# Patient Record
Sex: FEMALE | Race: AMERICAN INDIAN OR ALASKA NATIVE
[De-identification: names, ages, dates, MRNs, and addresses within clinical notes are randomized per-mention and may not be internally consistent; named-entity substitution may affect disease eponyms.]

---

## 2022-07-04 ENCOUNTER — HOSPITAL ENCOUNTER (INPATIENT)
Dept: HOSPITAL 5 - ED | Age: 87
LOS: 2 days | Discharge: HOME | DRG: 312 | End: 2022-07-06
Attending: INTERNAL MEDICINE | Admitting: INTERNAL MEDICINE
Payer: MEDICARE
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
## 2022-07-04 DIAGNOSIS — M06.9: ICD-10-CM

## 2022-07-04 DIAGNOSIS — I10: ICD-10-CM

## 2022-07-04 DIAGNOSIS — M48.56XA: ICD-10-CM

## 2022-07-04 DIAGNOSIS — E78.5: ICD-10-CM
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
## 2022-07-04 DIAGNOSIS — R00.2: ICD-10-CM

## 2022-07-04 DIAGNOSIS — R55: Primary | ICD-10-CM

## 2022-07-04 DIAGNOSIS — R07.89: ICD-10-CM
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
## 2022-07-04 DIAGNOSIS — M48.061: ICD-10-CM

## 2022-07-04 LAB
ALBUMIN SERPL-MCNC: 4.5 G/DL (ref 3.9–5)
ALT SERPL-CCNC: 9 UNITS/L (ref 7–56)
BASOPHILS # (AUTO): 0 K/MM3 (ref 0–0.1)
BASOPHILS NFR BLD AUTO: 0.6 % (ref 0–1.8)
BLOOD UR QL VISUAL: (no result)
BUN SERPL-MCNC: 13 MG/DL (ref 7–17)
BUN/CREAT SERPL: 14 %
CALCIUM SERPL-MCNC: 8.7 MG/DL (ref 8.4–10.2)
EOSINOPHIL # BLD AUTO: 0.2 K/MM3 (ref 0–0.4)
EOSINOPHIL NFR BLD AUTO: 2.7 % (ref 0–4.3)
HCT VFR BLD CALC: 38.7 % (ref 30.3–42.9)
HEMOLYSIS INDEX: 4
HGB BLD-MCNC: 12.5 GM/DL (ref 10.1–14.3)
LYMPHOCYTES # BLD AUTO: 1.4 K/MM3 (ref 1.2–5.4)
LYMPHOCYTES NFR BLD AUTO: 21.4 % (ref 13.4–35)
MCHC RBC AUTO-ENTMCNC: 32 % (ref 30–34)
MCV RBC AUTO: 94 FL (ref 79–97)
MONOCYTES # (AUTO): 0.5 K/MM3 (ref 0–0.8)
MONOCYTES % (AUTO): 8 % (ref 0–7.3)
PH UR STRIP: 7 [PH] (ref 5–7)
PLATELET # BLD: 193 K/MM3 (ref 140–440)
PROT UR STRIP-MCNC: (no result) MG/DL
RBC # BLD AUTO: 4.12 M/MM3 (ref 3.65–5.03)
RBC #/AREA URNS HPF: 9 /HPF (ref 0–6)
UROBILINOGEN UR-MCNC: 1 MG/DL (ref ?–2)
WBC #/AREA URNS HPF: 12 /HPF (ref 0–6)

## 2022-07-04 PROCEDURE — 36415 COLL VENOUS BLD VENIPUNCTURE: CPT

## 2022-07-04 PROCEDURE — 85610 PROTHROMBIN TIME: CPT

## 2022-07-04 PROCEDURE — 93321 DOPPLER ECHO F-UP/LMTD STD: CPT

## 2022-07-04 PROCEDURE — 87086 URINE CULTURE/COLONY COUNT: CPT

## 2022-07-04 PROCEDURE — 71275 CT ANGIOGRAPHY CHEST: CPT

## 2022-07-04 PROCEDURE — 72131 CT LUMBAR SPINE W/O DYE: CPT

## 2022-07-04 PROCEDURE — 71046 X-RAY EXAM CHEST 2 VIEWS: CPT

## 2022-07-04 PROCEDURE — 80061 LIPID PANEL: CPT

## 2022-07-04 PROCEDURE — 93308 TTE F-UP OR LMTD: CPT

## 2022-07-04 PROCEDURE — 80048 BASIC METABOLIC PNL TOTAL CA: CPT

## 2022-07-04 PROCEDURE — 84484 ASSAY OF TROPONIN QUANT: CPT

## 2022-07-04 PROCEDURE — 71110 X-RAY EXAM RIBS BIL 3 VIEWS: CPT

## 2022-07-04 PROCEDURE — 81001 URINALYSIS AUTO W/SCOPE: CPT

## 2022-07-04 PROCEDURE — 85025 COMPLETE CBC W/AUTO DIFF WBC: CPT

## 2022-07-04 PROCEDURE — 80053 COMPREHEN METABOLIC PANEL: CPT

## 2022-07-04 PROCEDURE — 72125 CT NECK SPINE W/O DYE: CPT

## 2022-07-04 PROCEDURE — 93325 DOPPLER ECHO COLOR FLOW MAPG: CPT

## 2022-07-04 PROCEDURE — 93005 ELECTROCARDIOGRAM TRACING: CPT

## 2022-07-04 PROCEDURE — 70450 CT HEAD/BRAIN W/O DYE: CPT

## 2022-07-04 PROCEDURE — 93880 EXTRACRANIAL BILAT STUDY: CPT

## 2022-07-04 PROCEDURE — 85730 THROMBOPLASTIN TIME PARTIAL: CPT

## 2022-07-04 NOTE — CAT SCAN REPORT
CT HEAD WITHOUT CONTRAST



INDICATION / CLINICAL INFORMATION:

syncope 4 days ago.



TECHNIQUE:

All CT scans at this location are performed using CT dose reduction for ALARA by means of automated e
xposure control. 



COMPARISON:

None available.



FINDINGS:

HEMORRHAGE: No evidence of intracranial hemorrhage or extra-axial fluid collection.

EXTRA-AXIAL SPACES: Cortical sulci, sylvian fissures and basilar cisterns have an unremarkable appear
ance.

VENTRICULAR SYSTEM: The third and lateral ventricles are of normal size and configuration.

CEREBRAL PARENCHYMA: No areas of abnormal brain parenchymal attenuation are identified. There is no i
ndication of recent infarction. 

MIDLINE SHIFT OR HERNIATION: There is no mass effect.

CEREBELLUM / BRAINSTEM: Brainstem and cerebellum have an unremarkable appearance.

MIDLINE STRUCTURES:No abnormalities of the pituitary gland or pineal region are identified.

INTRACRANIAL VESSELS:No abnormalities are identified on this noncontrast head CT.

ORBITS: Status post bilateral cataract surgery. No additional abnormality.

SOFT TISSUES of HEAD: No significant abnormality.

CALVARIUM: Evaluation of bone windows reveals no abnormalities.

PARANASAL SINUSES / MASTOID AIR CELLS: Visualized portions of the paranasal sinuses are free from inf
lammatory mucosal disease. Mastoid air cells are normally pneumatized.







IMPRESSION:

1. No significant intracranial abnormality. There is little in the way of age-related involutional ch
kumar in this 86-year-old individual.



Signer Name: Gildardo Sneed MD 

Signed: 7/4/2022 10:02 PM

Workstation Name: [id number]

## 2022-07-04 NOTE — CAT SCAN REPORT
CT CERVICAL SPINE WITHOUT CONTRAST



INDICATION / CLINICAL INFORMATION:

syncope 4 days ago fall.



TECHNIQUE:

Axial CT images were obtained through the cervical spine. Sagittal and coronal reformatted images wer
e produced. All CT scans at this location are performed using CT dose reduction for ALARA by means of
 automated exposure control. 



COMPARISON:

None available.



FINDINGS:



POSTOPERATIVE CHANGE:none



ALIGNMENT: Normal alignment is maintained throughout the lumbar region.



VERTEBRAE: No indication of fracture or bone destruction.



DISC SPACES: Disc height is fairly well-maintained throughout.



DEGENERATIVE CHANGES: Osteoarthritic changes are seen at the atlantoaxial junction between the anteri
or arch of C1 and the odontoid process where loss of joint space and prominent osteophyte formation a
re observed. Facet arthropathy is present at multiple levels is associated with mild neuroforaminal n
arrowing.





CRANIOCERVICAL JUNCTION:No significant abnormality.



SPINAL CANAL: Central spinal canal is adequately maintained throughout.



PARASPINAL SOFT TISSUES: No significant abnormality. 



ADDITIONAL FINDINGS: None.



LUNG APICES: No significant abnormality of visualized lungs.



IMPRESSION:

1. No indication of fracture or traumatic subluxation.





Signer Name: Gildardo Sneed MD 

Signed: 7/4/2022 10:05 PM

Workstation Name: [id number]

## 2022-07-04 NOTE — HISTORY AND PHYSICAL REPORT
History of Present Illness


Date of examination: 07/04/22


Date of admission: 


07/04/2022


Chief complaint: 





Syncope


History of present illness: 





86-year-old female with known history of hyperlipidemia, hypertension, 

rheumatoid arthritis and depression presents to the emergency room today 

complaining of chest pain.  Chest pain onset was started about 24 hours ago 

after having a syncopal episode".  Patient states she was trying to get to the 

restroom and felt dizzy during which she fell and hit her chest on the ground.  

She since has been having chest pain.  She denies any radiation, no nausea 

vomiting, no abdominal pain, no headache or diaphoresis.  Patient denies any 

fever or chills.


Chest pain is said to be worse upon taking a deep breath and when she presses on

her chest.  She denies any shortness of breath.





Work-up in the emergency room today, CT scan of the head and neck were 

unremarkable.  EKG was also unremarkable.


Patient's oxygen saturation was slightly low at 91% on room air and was 

subsequently placed on oxygen by nasal cannula.





Patient being admitted for a syncopal episode.





Past History


Past Medical History: hypertension, other (Depression, childbirth x 10 

childbirth, elevated cholesterol, rheumatoid arthritis)


Past Surgical History: No surgical history


Social history: no significant social history


Family history: no significant family history





Medications and Allergies


                                    Allergies











Allergy/AdvReac Type Severity Reaction Status Date / Time


 


No Known Allergies Allergy   Unverified 07/04/22 17:24











                                Home Medications











 Medication  Instructions  Recorded  Confirmed  Last Taken  Type


 


AtorvaSTATin [Lipitor] 20 mg PO QHS 07/05/22 07/05/22 07/04/22 09:00 History


 


Cyproheptadine [Periactin] 4 mg PO BID 07/05/22 07/05/22 07/04/22 09:00 History


 


Cyproheptadine [Periactin] 4 mg PO BID 07/05/22 07/05/22 07/04/22 09:00 History


 


Diltiazem HCl [Diltiazem ER] 120 mg PO DAILY 07/05/22 07/05/22 07/04/22 09:00 

History


 


Duloxetine HCl 20 mg PO DAILY 07/05/22 07/05/22 07/04/22 09:00 History


 


Duloxetine HCl [Cymbalta] 60 mg PO 07/05/22 07/04/22 09:00 History


 


Meloxicam [Mobic] 7.5 mg PO QDAY 07/05/22 07/05/22 07/04/22 09:00 History


 


Spironolactone [Aldactone] 25 mg PO QDAY 07/05/22 07/05/22 07/04/22 09:00 

History


 


carvediloL [Coreg] 12.5 mg PO BID 07/05/22 07/05/22 07/04/22 09:00 History


 


hydrALAZINE [Apresoline TAB] 25 mg PO BID 07/05/22 07/05/22 07/04/22 09:00 

History











Active Meds: 


Active Medications





Acetaminophen (Acetaminophen 325 Mg Tab)  650 mg PO Q4H PRN


   PRN Reason: Pain MILD(1-3)/Fever >100.5/HA


Acetaminophen (Acetaminophen 325 Mg Tab)  650 mg PO Q6H PRN


   PRN Reason: Pain, Mild (1-3)


Aspirin (Aspirin Ec 325 Mg Tab)  325 mg PO QDAY JESUS


Magnesium Hydroxide (Magnesium Hydroxide (Mom) Oral Liqd Udc)  30 ml PO Q4H PRN


   PRN Reason: Constipation


Morphine Sulfate (Morphine 2 Mg/1 Ml Inj)  2 mg IV Q4H PRN


   PRN Reason: Pain, Moderate (4-6)


Morphine Sulfate (Morphine 4 Mg/1 Ml Inj)  4 mg IV Q4H PRN


   PRN Reason: Pain , Severe (7-10)


Morphine Sulfate (Morphine 4 Mg/1 Ml Inj)  2 mg IV Q5MIN PRN


   PRN Reason: Chest Pain unrelieved by NTG


Nitroglycerin (Nitroglycerin 0.4 Mg Tab Subl)  0.4 mg SL Q5M PRN


   PRN Reason: Chest Pain


Ondansetron HCl (Ondansetron 4 Mg/2 Ml Inj)  4 mg IV Q8H PRN


   PRN Reason: Nausea And Vomiting


Sodium Chloride (Sodium Chloride 0.9% 10 Ml Flush Syringe)  10 ml IV BID JESUS


Sodium Chloride (Sodium Chloride 0.9% 10 Ml Flush Syringe)  10 ml IV PRN PRN


   PRN Reason: LINE FLUSH


Sodium Chloride (Sodium Chloride 0.9% 10 Ml Flush Syringe)  10 ml IV PRN PRN


   PRN Reason: LINE FLUSH


Tramadol HCl (Tramadol 50 Mg Tab)  50 mg PO Q6H PRN


   PRN Reason: Pain, Moderate (4-6)











Review of Systems


Constitutional: no fever, no chills


Ears, nose, mouth and throat: no nasal congestion, no sore throat


Cardiovascular: chest pain, syncope, no palpitations


Respiratory: no cough, no shortness of breath


Gastrointestinal: no abdominal pain, no nausea, no vomiting, no diarrhea


Genitourinary Female: no pelvic pain, no flank pain, no dysuria, no hematuria


Musculoskeletal: no neck pain, no low back pain


Integumentary: no rash, no pruritis


Neurological: syncope, no headaches, no confusion


Psychiatric: no anxiety, no depression


Endocrine: no polyphagia, no polydipsia, no polyuria, no nocturia





Exam





- Constitutional


Vitals: 


                                        











Temp Pulse Resp BP Pulse Ox


 


    86   14   200/100   99 


 


    07/04/22 22:47  07/04/22 22:46  07/04/22 22:47  07/04/22 22:46











General appearance: Present: no acute distress, well-nourished





- EENT


Eyes: Present: PERRL, EOM intact.  Absent: scleral icterus


ENT: hearing intact, clear oral mucosa, dentition normal





- Neck


Neck: Present: supple, normal ROM





- Respiratory


Respiratory effort: normal


Respiratory: bilateral: CTA





- Cardiovascular


Rhythm: regular


Heart Sounds: Present: S1 & S2.  Absent: gallop, systolic murmur, diastolic 

murmur, rub, click


Details: 





Reproducible chest tenderness upon sternal palpation





- Extremities


Extremities: no ischemia, pulses intact, pulses symmetrical, No edema, normal 

temperature, normal color, Full ROM


Peripheral Pulses: within normal limits





- Abdominal


General gastrointestinal: Present: soft, non-tender, non-distended, normal bowel

sounds.  Absent: mass





- Integumentary


Integumentary: Present: clear, warm, dry, normal turgor.  Absent: rash





- Musculoskeletal


Musculoskeletal: strength equal bilaterally





- Psychiatric


Psychiatric: appropriate mood/affect, intact judgment & insight, memory intact, 

cooperative





- Neurologic


Neurologic: CNII-XII intact, no focal deficits, moves all extremities





HEART Score





- HEART Score


EKG: Normal


Age: > 65


Risk factors: 1-2 risk factors


Troponin: 


                                        











Troponin T  < 0.010 ng/mL (0.00-0.029)   07/04/22  18:11    











Troponin: < normal limit





Results





- Labs


CBC & Chem 7: 


                                 07/04/22 18:11





                                 07/04/22 20:32


Labs: 


                              Abnormal lab results











  07/04/22 07/04/22 07/04/22 Range/Units





  18:11 18:11 22:00 


 


Mono % (Auto)  8.0 H    (0.0-7.3)  %


 


Glucose   104 H   ()  mg/dL


 


Alkaline Phosphatase   146 H   ()  units/L


 


Urine WBC (Auto)    12.0 H  (0.0-6.0)  /HPF














Assessment and Plan





Assessment:





1.  Syncope-unclear etiology


2.  Chest pain-noncardiac


3.  Hypertension


4.  Hyperlipidemia








Plan:





1.  Patient admitted and placed on telemetry.


2.  We will schedule for echocardiogram and carotid Doppler.  Monitor EKG.


3.  We will resume routine home medications once reconciled.


4.  We will continue to monitor vital signs closely.








DVT prophylasi:SQ heparin





Code Status: Full Code.

## 2022-07-04 NOTE — CAT SCAN REPORT
CTA CHEST WITH CONTRAST



INDICATION / CLINICAL INFORMATION: chest pain, syncope.



TECHNIQUE: Axial CT images were obtained through the chest after injection of IV contrast. 3 plane MI
P and/or 3D reconstructions were produced. All CT scans at this location are performed using CT dose 
reduction for ALARA by means of automated exposure control. 



COMPARISON: None available.



FINDINGS:

PULMONARY EMBOLUS: Respiratory motion limits evaluation of the peripheral pulmonary arterial tree, pa
rticularly in the bases. Accounting for this, no filling defects are seen.

THORACIC AORTA: No significant abnormality. 

HEART: Left atrial enlargement.

CORONARY ARTERY CALCIFICATION: Present -- Moderate.

MEDIASTINUM / ESMER: No significant abnormality.

PLEURA: No pleural effusion. No pneumothorax.

LUNGS: No acute air space or interstitial disease. Mild atelectatic changes are noted in the bases.



ADDITIONAL FINDINGS: None.



UPPER ABDOMEN: Cholelithiasis. No gallbladder inflammation is seen.



SKELETAL STRUCTURES: No significant osseous abnormality. Chronic upper lumbar compression deformity i
s noted.



IMPRESSION:

1. Limited study due to respiratory motion. Accounting for this, no CT evidence for pulmonary embolis
m. 

2. No acute findings.



Signer Name: Pablo Yanez MD 

Signed: 7/4/2022 10:11 PM

Workstation Name: [id number]

## 2022-07-04 NOTE — CAT SCAN REPORT
CT LUMBAR SPINE WITHOUT CONTRAST



INDICATION / CLINICAL INFORMATION:

syncope 4 days ago, lumbar pain fall.



TECHNIQUE:

Axial CT images were obtained through the lumbar spine. Sagittal and coronal reformatted images were 
produced. All CT scans at this location are performed using CT dose reduction for ALARA by means of a
utomated exposure control. 



COMPARISON:

None available.



FINDINGS:





ALIGNMENT: Grade 1 spondylolisthesis is present at the L4-5 level. Otherwise normal alignment is main
tained.



VERTEBRAE: Compression deformity is present at the L1 vertebral body where about 35% loss of vertebra
l body height is noted. This appears to be chronic.



DISC SPACES: Disc height is fairly well-maintained throughout the lumbar region. Loss of disc height 
and disc vacuum phenomena is seen in the lower thoracic spine.



DEGENERATIVE CHANGES: Widespread facet arthropathy is observed. The lumbar neuroforamina are fairly w
ell-maintained. Note is made of stenosis between the spinous processes at the L2-3, L3-4 and L4-5 lev
els. This is known as Baastrop's disease which can be a pain generator in the lumbar region.



SPINAL CANAL: Grade 1 spondylolisthesis secondary to facet arthropathy is associated with severe cent
ral canal stenosis at the L4-5 level.



SACRUM:No significant abnormality of the visualized sacrum..

PARASPINAL SOFT TISSUES: Calcified atherosclerotic plaque is seen along the course of the abdominal a
dandre and common iliac arteries. 



IMPRESSION:

1. Compression deformity L1 vertebral body.

2. Severe central canal stenosis at L4-5 secondary to grade 1 degenerative spondylolisthesis.



Signer Name: Gildardo Sneed MD 

Signed: 7/4/2022 10:09 PM

Workstation Name: [id number]

## 2022-07-04 NOTE — XRAY REPORT
CHEST 2 VIEWS 



INDICATION / CLINICAL INFORMATION:

chest pain after a fall.



COMPARISON: 

None available.



FINDINGS:



SUPPORT DEVICES: None.



HEART / MEDIASTINUM: No significant abnormality. 



LUNGS / PLEURA: No significant pulmonary or pleural abnormality. No pneumothorax. 



ADDITIONAL FINDINGS: Severe degenerative changes seen within the left shoulder.

There is mild to moderate compression fracture with  retropulsion of what appears to be L2 vertebral 
body. Age is indeterminate.





IMPRESSION:

1. No acute pulmonary or pleural disease. 

2. Mild to moderate compression fracture of upper lumbar vertebral body. Age indeterminate.



Signer Name: Tarsha Granados MD 

Signed: 7/4/2022 6:19 PM

Workstation Name: [id number]

## 2022-07-04 NOTE — EMERGENCY DEPARTMENT REPORT
ED Chest Pain HPI





- General


Chief Complaint: Fall


Stated Complaint: CHEST PAIN, SHORT OF BREATH


Time Seen by Provider: 07/04/22 20:44


Source: patient, family


Mode of arrival: Ambulatory


Limitations: No Limitations





- History of Present Illness


Initial Comments: 





86-year-old female with a past medical history of hypertension, elevated 

cholesterol, depression, and rheumatoid arthritis presents to the hospital with 

persistent chest pain that started 1 day after syncopal episode.  Patient felt 

dizzy while getting out of bed 4 days ago.  She states her vision blackened and 

she woke up on the floor.  She denies any pain at this time.  The next day she 

has been having constant anterior chest pain that worsened today.  Pain is 

constant worse with palpation and deep inspiration.  Patient also has pain at 

her thoracic and lumbar spine but also has a history of chronic pain secondary 

to arthritis.  Patient complains of intermittent left-sided foot numbness 

ongoing for greater than 3 months.  No urinary incontinence reported.  Patient 

reports a negative stress test 6 months ago however, results are not available 

at this time.  Patient did not have any of her medications today and has not 

taken anything for pain





- Related Data


                                    Allergies











Allergy/AdvReac Type Severity Reaction Status Date / Time


 


No Known Allergies Allergy   Unverified 07/04/22 17:24














Heart Score





- HEART Score


History: Slightly suspicious


EKG: Normal


Age: > 65


Risk factors: 1-2 risk factors


Troponin: < normal limit


HEART Score: 3





- EKG Read Time


Time EKG Completed: 17:35


EKG Read Time: 17:35





ED Review of Systems


ROS: 


Stated complaint: CHEST PAIN, SHORT OF BREATH


Other details as noted in HPI








ED Past Medical Hx





- Past Medical History


Previous Medical History?: Yes


Hx Hypertension: Yes


Hx Psychiatric Treatment: Yes (Depression)


Additional medical history: childbirth x 10 childbirth, elevated cholesterol, 

rheumatoid arthritis





- Surgical History


Past Surgical History?: No





ED Physical Exam





- General


Limitations: Language Barrier





- Other


Other exam information: 





General: Mild distress secondary to pain


Head: Atraumatic


Eyes: normal appearance


ENT: Moist mucous membranes


Neck: Normal appearance, no midline tenderness


Chest: Clear to auscultation bilaterally, reproducible extremely tender anterior

chest wall to palpation


CV: Regular rate and rhythm


Abdomen: Soft, normal bowel sounds, nontender, nondistended, no rebound or 

guarding


Back: Normal inspection


Extremity: Mild bilateral pedal edema which family states occurred while sitting

in the waiting room for several hours.  No calf tenderness, warmth, or erythema


Neuro: Alert O x 3, no facial asymmetry, speech clear, no gross motor sensory 

deficit


Psych: Appropriate behavior


Skin: No rash





ED Course


                                   Vital Signs











  07/04/22 07/04/22 07/04/22





  17:20 20:15 20:43


 


Pulse Rate 103 H 120 H 


 


Respiratory 20 20 





Rate   


 


Blood Pressure   


 


Blood Pressure 100/45 117/64 





[Left]   


 


O2 Sat by Pulse  96 88





Oximetry   














  07/04/22 07/04/22 07/04/22





  20:47 21:00 21:16


 


Pulse Rate 79 77 75


 


Respiratory 15 24 30 H





Rate   


 


Blood Pressure  205/100 205/100


 


Blood Pressure   





[Left]   


 


O2 Sat by Pulse  94 95





Oximetry   














  07/04/22 07/04/22 07/04/22





  21:48 22:00 22:16


 


Pulse Rate 83 84 81


 


Respiratory 10 L 21 24





Rate   


 


Blood Pressure 191/90 191/90 183/163


 


Blood Pressure   





[Left]   


 


O2 Sat by Pulse 87 89 94





Oximetry   














  07/04/22 07/04/22 07/04/22





  22:30 22:46 22:47


 


Pulse Rate 84 71 86


 


Respiratory 18 14 





Rate   


 


Blood Pressure 199/92 197/100 200/100


 


Blood Pressure   





[Left]   


 


O2 Sat by Pulse 92 99 





Oximetry   














QUIANA score





- Quiana Score


Age > 65: (0) No


Aspirin use within the Past 7 Days: (0) No


3 or more CAD Risk Factors: (0) No


2 or more Angina events in past 24 hrs: (0) No


Known CAD with more than 50% Stenosis: (0) No


Elevated Cardiac Markers: (0) No


ST Deviation Greater than 0.5mm: (0) No


QUIANA Score: 0





ED Medical Decision Making





- Lab Data


Result diagrams: 


                                 07/04/22 18:11





                                 07/04/22 18:11








                                   Lab Results











  07/04/22 07/04/22 Range/Units





  18:11 18:11 


 


WBC  5.8   (4.5-11.0)  K/mm3


 


RBC  4.12   (3.65-5.03)  M/mm3


 


Hgb  12.5   (10.1-14.3)  gm/dl


 


Hct  38.7   (30.3-42.9)  %


 


MCV  94   (79-97)  fl


 


MCH  30   (28-32)  pg


 


MCHC  32   (30-34)  %


 


RDW  14.4   (13.2-15.2)  %


 


Plt Count  193   (140-440)  K/mm3


 


Lymph % (Auto)  21.4   (13.4-35.0)  %


 


Mono % (Auto)  8.0 H   (0.0-7.3)  %


 


Eos % (Auto)  2.7   (0.0-4.3)  %


 


Baso % (Auto)  0.6   (0.0-1.8)  %


 


Lymph # (Auto)  1.4   (1.2-5.4)  K/mm3


 


Mono # (Auto)  0.5   (0.0-0.8)  K/mm3


 


Eos # (Auto)  0.2   (0.0-0.4)  K/mm3


 


Baso # (Auto)  0.0   (0.0-0.1)  K/mm3


 


Seg Neutrophils %  67.3   (40.0-70.0)  %


 


Seg Neutrophils #  4.3   (1.8-7.7)  K/mm3


 


Sodium   142  (137-145)  mmol/L


 


Potassium   4.2  (3.6-5.0)  mmol/L


 


Chloride   105.1  ()  mmol/L


 


Carbon Dioxide   27  (22-30)  mmol/L


 


Anion Gap   14  mmol/L


 


BUN   13  (7-17)  mg/dL


 


Creatinine   0.9  (0.6-1.2)  mg/dL


 


Estimated GFR   59  ml/min


 


BUN/Creatinine Ratio   14  %


 


Glucose   104 H  ()  mg/dL


 


Calcium   8.7  (8.4-10.2)  mg/dL


 


Total Bilirubin   0.40  (0.1-1.2)  mg/dL


 


AST   16  (5-40)  units/L


 


ALT   9  (7-56)  units/L


 


Alkaline Phosphatase   146 H  ()  units/L


 


Troponin T   < 0.010  (0.00-0.029)  ng/mL


 


Total Protein   6.5  (6.3-8.2)  g/dL


 


Albumin   4.5  (3.9-5)  g/dL


 


Albumin/Globulin Ratio   2.3  %














- EKG Data


-: EKG Interpreted by Me


EKG shows normal: sinus rhythm, ST-T waves (No STEMI)


Rate: normal (69)





- EKG Data


When compared to previous EKG there are: previous EKG unavailable





- Radiology Data


Radiology results: report reviewed





CHEST 2 VIEWS   


 


 INDICATION / CLINICAL INFORMATION:  


 chest pain after a fall.  


 


 COMPARISON:   


 None available.  


 


 FINDINGS:  


 


 SUPPORT DEVICES: None.  


 


 HEART / MEDIASTINUM: No significant abnormality.   


 


 LUNGS / PLEURA: No significant pulmonary or pleural abnormality. No 

pneumothorax.   


 


 ADDITIONAL FINDINGS: Severe degenerative changes seen within the left shoulder.

  


 There is mild to moderate compression fracture with  retropulsion of what 

appears to be L2 


vertebral body. Age is indeterminate.  


 


 


 IMPRESSION:  


 1. No acute pulmonary or pleural disease.   


 2. Mild to moderate compression fracture of upper lumbar vertebral body. Age 

indeterminate.  


 CT HEAD WITHOUT CONTRAST  


 


 INDICATION / CLINICAL INFORMATION:  


 syncope 4 days ago.  


 


 TECHNIQUE:  


 All CT scans at this location are performed using CT dose reduction for ALARA 

by means of automated


exposure control.   


 


 COMPARISON:  


 None available.  


 


 FINDINGS:  


 HEMORRHAGE: No evidence of intracranial hemorrhage or extra-axial fluid 

collection.  


 EXTRA-AXIAL SPACES: Cortical sulci, sylvian fissures and basilar cisterns have 

an unremarkable 


appearance.  


 VENTRICULAR SYSTEM: The third and lateral ventricles are of normal size and 

configuration.  


 CEREBRAL PARENCHYMA: No areas of abnormal brain parenchymal attenuation are 

identified. There is no


indication of recent infarction.   


 MIDLINE SHIFT OR HERNIATION: There is no mass effect.  


 CEREBELLUM / BRAINSTEM: Brainstem and cerebellum have an unremarkable 

appearance.  


 MIDLINE STRUCTURES:No abnormalities of the pituitary gland or pineal region are

 identified.  


 INTRACRANIAL VESSELS:No abnormalities are identified on this noncontrast head 

CT.  


 ORBITS: Status post bilateral cataract surgery. No additional abnormality.  


 SOFT TISSUES of HEAD: No significant abnormality.  


 CALVARIUM: Evaluation of bone windows reveals no abnormalities.  


 PARANASAL SINUSES / MASTOID AIR CELLS: Visualized portions of the paranasal 

sinuses are free from 


inflammatory mucosal disease. Mastoid air cells are normally pneumatized.  


 


 


 


 IMPRESSION:  


 1. No significant intracranial abnormality. There is little in the way of age-

related involutional 


change in this 86-year-old individual.  





 


CT CERVICAL SPINE WITHOUT CONTRAST  


 


 INDICATION / CLINICAL INFORMATION:  


 syncope 4 days ago fall.  


 


 TECHNIQUE:  


 Axial CT images were obtained through the cervical spine. Sagittal and coronal 

reformatted images 


were produced. All CT scans at this location are performed using CT dose 

reduction for ALARA by 


means of automated exposure control.   


 


 COMPARISON:  


 None available.  


 


 FINDINGS:  


 


 POSTOPERATIVE CHANGE:none  


 


 ALIGNMENT: Normal alignment is maintained throughout the lumbar region.  


 


 VERTEBRAE: No indication of fracture or bone destruction.  


 


 DISC SPACES: Disc height is fairly well-maintained throughout.  


 


 DEGENERATIVE CHANGES: Osteoarthritic changes are seen at the atlantoaxial 

junction between the 


anterior arch of C1 and the odontoid process where loss of joint space and 

prominent osteophyte 


formation are observed. Facet arthropathy is present at multiple levels is 

associated with mild 


neuroforaminal narrowing.  


 


 


 CRANIOCERVICAL JUNCTION:No significant abnormality.  


 


 SPINAL CANAL: Central spinal canal is adequately maintained throughout.  


 


 PARASPINAL SOFT TISSUES: No significant abnormality.   


 


 ADDITIONAL FINDINGS: None.  


 


 LUNG APICES: No significant abnormality of visualized lungs.  


 


 IMPRESSION:  


 1. No indication of fracture or traumatic subluxation.  





 


CT LUMBAR SPINE WITHOUT CONTRAST  


 


 INDICATION / CLINICAL INFORMATION:  


 syncope 4 days ago, lumbar pain fall.  


 


 TECHNIQUE:  


 Axial CT images were obtained through the lumbar spine. Sagittal and coronal 

reformatted images 


were produced. All CT scans at this location are performed using CT dose 

reduction for ALARA by 


means of automated exposure control.   


 


 COMPARISON:  


 None available.  


 


 FINDINGS:  


 


 


 ALIGNMENT: Grade 1 spondylolisthesis is present at the L4-5 level. Otherwise 

normal alignment is 


maintained.  


 


 VERTEBRAE: Compression deformity is present at the L1 vertebral body where 

about 35% loss of 


vertebral body height is noted. This appears to be chronic.  


 


 DISC SPACES: Disc height is fairly well-maintained throughout the lumbar 

region. Loss of disc 


height and disc vacuum phenomena is seen in the lower thoracic spine.  


 


 DEGENERATIVE CHANGES: Widespread facet arthropathy is observed. The lumbar 

neuroforamina are fairly


well-maintained. Note is made of stenosis between the spinous processes at the 

L2-3, L3-4 and L4-5 


levels. This is known as Baastrop's disease which can be a pain generator in the

 lumbar region.  


 


 SPINAL CANAL: Grade 1 spondylolisthesis secondary to facet arthropathy is 

associated with severe 


central canal stenosis at the L4-5 level.  


 


 SACRUM:No significant abnormality of the visualized sacrum..  


 PARASPINAL SOFT TISSUES: Calcified atherosclerotic plaque is seen along the 

course of the abdominal


aorta and common iliac arteries.   


 


 IMPRESSION:  


 1. Compression deformity L1 vertebral body.  


 2. Severe central canal stenosis at L4-5 secondary to grade 1 degenerative 

spondylolisthesis.  


 CTA CHEST WITH CONTRAST  


 


 INDICATION / CLINICAL INFORMATION: chest pain, syncope.  


 


 TECHNIQUE: Axial CT images were obtained through the chest after injection of 

IV contrast. 3 plane 


MIP and/or 3D reconstructions were produced. All CT scans at this location are 

performed using CT d


ose reduction for ALARA by means of automated exposure control.   


 


 COMPARISON: None available.  


 


 FINDINGS:  


 PULMONARY EMBOLUS: Respiratory motion limits evaluation of the peripheral 

pulmonary arterial tree, 


particularly in the bases. Accounting for this, no filling defects are seen.  


 THORACIC AORTA: No significant abnormality.   


 HEART: Left atrial enlargement.  


 CORONARY ARTERY CALCIFICATION: Present -- Moderate.  


 MEDIASTINUM / ESMER: No significant abnormality.  


 PLEURA: No pleural effusion. No pneumothorax.  


 LUNGS: No acute air space or interstitial disease. Mild atelectatic changes are

 noted in the bases.  


 


 ADDITIONAL FINDINGS: None.  


 


 UPPER ABDOMEN: Cholelithiasis. No gallbladder inflammation is seen.  


 


 SKELETAL STRUCTURES: No significant osseous abnormality. Chronic upper lumbar 

compression deformity


is noted.  


 


 IMPRESSION:  


 1. Limited study due to respiratory motion. Accounting for this, no CT evidence

 for pulmonary 


embolism.   


 2. No acute findings.  





- Medical Decision Making





86-year-old female with several cardiac risk factors presents to the hospital 

planing of anterior chest pain after a syncopal episode 4 days ago.  Cause a 

syncopal episode is unclear differential includes vasovagal or cardiac event.  

Chest pain appears to be reproducible with palpation and movement.  Relief with 

 morphine and medication in the ED. patient had extensive imaging including CT 

head, CT cervical spine, CTA chest, and CT lumbar spine.  No acute findings 

noted.  Chronic compression fractures noted.  EKG without acute ST elevation MI 

with troponin negative x1.  No previous EKG available for comparison.  Labetalol

 IV provided for uncontrolled hypertension although patient did not take any of 

her BP medications today. patient will be admitted to the hospitalist service 

for further monitoring and evaluation due to recent syncopal episode


Critical Care Time: No


Critical care attestation.: 


If time is entered above; I have spent that time in minutes in the direct care 

of this critically ill patient, excluding procedure time.








ED Disposition


Clinical Impression: 


 Syncope, Chest wall pain, Uncontrolled hypertension





Disposition: 09 ADMITTED AS INPATIENT


Is pt being admited?: Yes


Condition: Stable


Instructions:  Syncope (ED), Hypertension (ED)


Time of Disposition: 22:37 (gbenle/hospitalist)

## 2022-07-05 LAB
APTT BLD: 36.5 SEC. (ref 24.2–36.6)
BUN SERPL-MCNC: 13 MG/DL (ref 7–17)
BUN SERPL-MCNC: 14 MG/DL (ref 7–17)
BUN/CREAT SERPL: 16 %
BUN/CREAT SERPL: 19 %
CALCIUM SERPL-MCNC: 8.2 MG/DL (ref 8.4–10.2)
CALCIUM SERPL-MCNC: 8.6 MG/DL (ref 8.4–10.2)
HDLC SERPL-MCNC: 40 MG/DL (ref 40–59)
HEMOLYSIS INDEX: 10
HEMOLYSIS INDEX: 99
INR PPP: 1.11 (ref 0.87–1.13)

## 2022-07-05 RX ADMIN — Medication SCH ML: at 09:27

## 2022-07-05 RX ADMIN — MORPHINE SULFATE PRN MG: 2 INJECTION, SOLUTION INTRAMUSCULAR; INTRAVENOUS at 21:43

## 2022-07-05 RX ADMIN — HEPARIN SODIUM SCH UNIT: 5000 INJECTION, SOLUTION INTRAVENOUS; SUBCUTANEOUS at 21:42

## 2022-07-05 RX ADMIN — Medication SCH ML: at 21:43

## 2022-07-05 NOTE — CONSULTATION
History of Present Illness


Consult date: 07/05/22


Consult reason: chest pain


History of present illness: 





The patient is an 86-year-old woman who reports no significant prior cardiac 

history.  She presented to the emergency room with chest pain.  Patient reports 

that she was getting out of bed to go to the bathroom in the middle of the 

night, when she lost her balance, and fell forward on her face and chest to the 

floor.  Thereafter, she developed chest pain and chest wall tenderness, which 

prompted her emergency room visit.  Work-up in the hospital so far, ECG was 

sinus rhythm, with nonspecific T wave changes.  Serial troponin levels were 

unremarkable with borderline levels of 0.01-0.03.  Chest x-ray revealed a 

normal-sized cardiac silhouette and clear lungs.





The patient was ordered for an echocardiogram by the medical service, which 

apparently caused her distress with marked exacerbation of her chest wall pain 

in the process of obtaining images with the echo probe on her chest.  At this 

time, she continues to have marked chest wall tenderness with palpation.





Past History


Past Medical History: hypertension


Past Surgical History: No surgical history


Social history: no significant social history


Family history: no significant family history





Medications and Allergies


                                    Allergies











Allergy/AdvReac Type Severity Reaction Status Date / Time


 


No Known Allergies Allergy   Unverified 07/04/22 17:24











                                Home Medications











 Medication  Instructions  Recorded  Confirmed  Last Taken  Type


 


AtorvaSTATin [Lipitor] 20 mg PO QHS 07/05/22 07/05/22 07/04/22 09:00 History


 


Cyproheptadine [Periactin] 4 mg PO BID 07/05/22 07/05/22 07/04/22 09:00 History


 


Cyproheptadine [Periactin] 4 mg PO BID 07/05/22 07/05/22 07/04/22 09:00 History


 


Diltiazem HCl [Diltiazem ER] 120 mg PO DAILY 07/05/22 07/05/22 07/04/22 09:00 

History


 


Duloxetine HCl 20 mg PO DAILY 07/05/22 07/05/22 07/04/22 09:00 History


 


Duloxetine HCl [Cymbalta] 60 mg PO 07/05/22 07/04/22 09:00 History


 


Meloxicam [Mobic] 7.5 mg PO QDAY 07/05/22 07/05/22 07/04/22 09:00 History


 


Spironolactone [Aldactone] 25 mg PO QDAY 07/05/22 07/05/22 07/04/22 09:00 

History


 


carvediloL [Coreg] 12.5 mg PO BID 07/05/22 07/05/22 07/04/22 09:00 History


 


hydrALAZINE [Apresoline TAB] 25 mg PO BID 07/05/22 07/05/22 07/04/22 09:00 

History











Active Meds: 


Active Medications





Acetaminophen (Acetaminophen 325 Mg Tab)  650 mg PO Q4H PRN


   PRN Reason: Pain MILD(1-3)/Fever >100.5/HA


Atorvastatin Calcium (Atorvastatin 40 Mg Tab)  40 mg PO QHS [facility]


Magnesium Hydroxide (Magnesium Hydroxide (Mom) Oral Liqd Udc)  30 ml PO Q4H PRN


   PRN Reason: Constipation


Morphine Sulfate (Morphine 2 Mg/1 Ml Inj)  2 mg IV Q4H PRN


   PRN Reason: Pain, Moderate (4-6)


Morphine Sulfate (Morphine 4 Mg/1 Ml Inj)  4 mg IV Q4H PRN


   PRN Reason: Pain , Severe (7-10)


   Last Admin: 07/05/22 09:28 Dose:  4 mg


   


Morphine Sulfate (Morphine 4 Mg/1 Ml Inj)  2 mg IV Q5MIN PRN


   PRN Reason: Chest Pain unrelieved by NTG


Nitroglycerin (Nitroglycerin 0.4 Mg Tab Subl)  0.4 mg SL Q5M PRN


   PRN Reason: Chest Pain


Ondansetron HCl (Ondansetron 4 Mg/2 Ml Inj)  4 mg IV Q8H PRN


   PRN Reason: Nausea And Vomiting


Sodium Chloride (Sodium Chloride 0.9% 10 Ml Flush Syringe)  10 ml IV BID [facility]


   Last Admin: 07/05/22 09:27 Dose:  10 ml


   


Sodium Chloride (Sodium Chloride 0.9% 10 Ml Flush Syringe)  10 ml IV PRN PRN


   PRN Reason: LINE FLUSH


Tramadol HCl (Tramadol 50 Mg Tab)  50 mg PO Q6H PRN


   PRN Reason: Pain, Moderate (4-6)











Review of Systems


Cardiovascular: chest pain, no orthopnea, no palpitations, no rapid/irregular 

heart beat, no edema, no syncope, no lightheadedness, no shortness of breath





Physical Examination


                                   Vital Signs











Pulse Resp BP


 


 103 H  20   100/45 


 


 07/04/22 17:20  07/04/22 17:20  07/04/22 17:20











General appearance: mild distress


HEENT: Positive: PERRL


Neck: Positive: neck supple


Cardiac: Positive: Reg Rate and Rhythm


Lungs: Positive: clear to auscultation


Neuro: Positive: Grossly Intact


Abdomen: Positive: Soft


Female genitourinary: deferred


Skin: Positive: Clear


Extremities: Absent: edema





Results





                                 07/04/22 18:11





                                 07/05/22 05:18


                                 Cardiac Enzymes











  07/04/22 Range/Units





  18:11 


 


AST  16  (5-40)  units/L








                                   Coagulation











  07/05/22 Range/Units





  10:47 


 


PT  15.6 H  (12.2-14.9)  Sec.


 


INR  1.11  (0.87-1.13)  


 


APTT  36.5  (24.2-36.6)  Sec.








                                     Lipids











  07/05/22 Range/Units





  05:18 


 


Triglycerides  48  (2-149)  mg/dL


 


Cholesterol  104  ()  mg/dL


 


HDL Cholesterol  40  (40-59)  mg/dL


 


Cholesterol/HDL Ratio  2.60  %








                                       CBC











  07/04/22 Range/Units





  18:11 


 


WBC  5.8  (4.5-11.0)  K/mm3


 


RBC  4.12  (3.65-5.03)  M/mm3


 


Hgb  12.5  (10.1-14.3)  gm/dl


 


Hct  38.7  (30.3-42.9)  %


 


Plt Count  193  (140-440)  K/mm3


 


Lymph # (Auto)  1.4  (1.2-5.4)  K/mm3


 


Mono # (Auto)  0.5  (0.0-0.8)  K/mm3


 


Eos # (Auto)  0.2  (0.0-0.4)  K/mm3


 


Baso # (Auto)  0.0  (0.0-0.1)  K/mm3








                          Comprehensive Metabolic Panel











  07/04/22 07/04/22 07/05/22 Range/Units





  18:11 20:32 05:18 


 


Sodium  142  139  144  (137-145)  mmol/L


 


Potassium  4.2  4.5  4.1  (3.6-5.0)  mmol/L


 


Chloride  105.1  103.4  107.1 H  ()  mmol/L


 


Carbon Dioxide  27  25  29  (22-30)  mmol/L


 


BUN  13  13  14  (7-17)  mg/dL


 


Creatinine  0.9  0.7  0.9  (0.6-1.2)  mg/dL


 


Glucose  104 H  116 H  109 H  ()  mg/dL


 


Calcium  8.7  8.2 L  8.6  (8.4-10.2)  mg/dL


 


AST  16    (5-40)  units/L


 


ALT  9    (7-56)  units/L


 


Alkaline Phosphatase  146 H    ()  units/L


 


Total Protein  6.5    (6.3-8.2)  g/dL


 


Albumin  4.5    (3.9-5)  g/dL














EKG interpretations





- Telemetry


EKG Rhythm: Sinus Rhythm (With nonspecific T wave abnormality)





Assessment and Plan





- Patient Problems


(1) Chest wall pain


Current Visit: Yes   Status: Acute   


Plan to address problem: 


Patient presents to the hospital with chest wall pain and tenderness, following 

a fall at home.  No cardiac work-up is indicated for chest wall pain and 

tenderness following reported trauma.





I will order x-ray of the ribs, and order ketorolac for management of chest wall

pain.

## 2022-07-05 NOTE — PROGRESS NOTE
Assessment and Plan





-- Syncope-unclear etiology, likely vasovagal


-- Chest pain-noncardiac: Tenderness on palpation likely due to trauma from 

syncope


-- Hypertension


-- Hyperlipidemia


-- Lumbar spine compression fracture 


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








Plan:


--  Patient admitted and placed on telemetry.


-- EF 45% on echocardiogram and less than 50% stenosis bilaterally on carotid 

Doppler. 


-- Hold beta-blocker for now, obtain orthostatic vitals


--Patient denies any lumbar pain numbness or tingling


-- Cardiology recommended ketorolac IV as needed for musculoskeletal chest pain


-- wait for PT eval and orthostatic vitals








DVT prophylasi:SQ heparin





Code Status: Full Code














Subjective


Date of service: 07/05/22


Interval history: 





Patient seen and examined.  Medical records and medication list reviewed.  


No acute event overnight noted by the RN.  


Patient continues to complains of chest pain.  Patient is tolerating diet.  


Discussed plan of care at bedside with patient.








Objective





- Constitutional


Vitals: 


                               Vital Signs - 12hr











  07/05/22 07/05/22 07/05/22





  06:46 07:00 07:16


 


Temperature   


 


Pulse Rate 60 60 61


 


Respiratory 15 16 16





Rate   


 


Blood Pressure 131/55 127/61 124/65


 


O2 Sat by Pulse 98 98 98





Oximetry   














  07/05/22 07/05/22 07/05/22





  07:30 08:20 08:30


 


Temperature   


 


Pulse Rate 61 61 66


 


Respiratory 16 16 16





Rate   


 


Blood Pressure 130/67 127/67 138/65


 


O2 Sat by Pulse 98 98 98





Oximetry   














  07/05/22 07/05/22 07/05/22





  09:24 12:00 14:54


 


Temperature   


 


Pulse Rate  77 


 


Respiratory   





Rate   


 


Blood Pressure   


 


O2 Sat by Pulse 94  98





Oximetry   














  07/05/22





  15:54


 


Temperature 97.4 F L


 


Pulse Rate 79


 


Respiratory 





Rate 


 


Blood Pressure 188/92


 


O2 Sat by Pulse 87





Oximetry 











General appearance: Present: no acute distress, other (Elderly female)





- EENT


Eyes: PERRL, EOM intact


ENT: hearing intact, clear oral mucosa


Ears: bilateral: normal





- Neck


Neck: supple, normal ROM





- Respiratory


Respiratory effort: normal


Respiratory: bilateral: CTA





- Cardiovascular


Rhythm: regular


Heart Sounds: Present: S1 & S2.  Absent: gallop, rub


Details: 





Patient has tenderness on the chest upon palpation


Extremities: pulses intact, No edema, normal color, Full ROM





- Gastrointestinal


General gastrointestinal: Present: soft, non-tender, non-distended, normal bowel

sounds





- Integumentary


Integumentary: clear, warm, dry





- Musculoskeletal


Musculoskeletal: 1, strength equal bilaterally





- Neurologic


Neurologic: moves all extremities





- Psychiatric


Psychiatric: memory intact, appropriate mood/affect, intact judgment & insight





- Labs


CBC & Chem 7: 


                                 07/04/22 18:11





                                 07/05/22 05:18


Labs: 


                              Abnormal lab results











  07/04/22 07/04/22 07/04/22 Range/Units





  18:11 18:11 20:32 


 


Mono % (Auto)  8.0 H    (0.0-7.3)  %


 


PT     (12.2-14.9)  Sec.


 


Chloride     ()  mmol/L


 


Glucose   104 H  116 H  ()  mg/dL


 


Calcium    8.2 L  (8.4-10.2)  mg/dL


 


Alkaline Phosphatase   146 H   ()  units/L


 


Troponin T     (0.00-0.029)  ng/mL


 


Urine WBC (Auto)     (0.0-6.0)  /HPF














  07/04/22 07/05/22 07/05/22 Range/Units





  22:00 05:18 05:18 


 


Mono % (Auto)     (0.0-7.3)  %


 


PT     (12.2-14.9)  Sec.


 


Chloride   107.1 H   ()  mmol/L


 


Glucose   109 H   ()  mg/dL


 


Calcium     (8.4-10.2)  mg/dL


 


Alkaline Phosphatase     ()  units/L


 


Troponin T    0.037 H D  (0.00-0.029)  ng/mL


 


Urine WBC (Auto)  12.0 H    (0.0-6.0)  /HPF














  07/05/22 07/05/22 Range/Units





  10:47 17:13 


 


Mono % (Auto)    (0.0-7.3)  %


 


PT  15.6 H   (12.2-14.9)  Sec.


 


Chloride    ()  mmol/L


 


Glucose    ()  mg/dL


 


Calcium    (8.4-10.2)  mg/dL


 


Alkaline Phosphatase    ()  units/L


 


Troponin T   0.115 H* D  (0.00-0.029)  ng/mL


 


Urine WBC (Auto)    (0.0-6.0)  /HPF














HEART Score





- HEART Score


EKG: Normal


Age: > 65


Risk factors: 1-2 risk factors


Troponin: 


                                        











Troponin T  0.115 ng/mL (0.00-0.029)  H* D 07/05/22  17:13    











Troponin: < normal limit

## 2022-07-05 NOTE — XRAY REPORT
BILATERAL RIBS 3 VIEWS



INDICATION / CLINICAL INFORMATION: Chest pain, tenderness, post trauma.



COMPARISON: Chest CT dated 7/4/2022



FINDINGS:



RIBS: No acute, displaced fracture or other acute abnormality.



LUNGS: No acute findings. No pneumothorax.



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 



Signer Name: Dakota Sanders DO 

Signed: 7/5/2022 2:54 PM

Workstation Name: [email or web address] [id number]

## 2022-07-05 NOTE — VASCULAR LAB REPORT
DUPLEX DOPPLER ULTRASOUND CAROTID, BILATERAL



INDICATION / CLINICAL INFORMATION: Syncope.



COMPARISON: None available.



FINDINGS:



RIGHT CAROTID: Mild atherosclerotic plaque.

- PLAQUE ESTIMATE (%): < 50%

- CCA velocity: 61 cm/sec.

- ICA peak systolic velocity: 80 cm/sec.

- ICA/CCA PSV Ratio: Less than 2.



Right Vertebral Artery: Antegrade flow.



LEFT CAROTID: Mild atherosclerotic plaque.

- PLAQUE ESTIMATE (%): < 50%

- CCA velocity: 69 cm/sec.

- ICA peak systolic velocity: 121 cm/sec.

- ICA/CCA PSV Ratio: Less than 2.



Left Vertebral Artery: Antegrade flow.



IMPRESSION:

1. Right Internal Carotid Artery: Less than 50% diameter stenosis.

2. Left Internal Carotid Artery: Less than 50% diameter stenosis.







Velocity criteria are extrapolated from diameter data as defined by the Society of Radiologists in Ul
trasound Consensus Conference, Radiology 2003; 229;340-346.

=======================================================

NO STENOSIS (NORMAL)

     - Plaque = none; ICA PSV < 125 cm/sec; ICA/CCA PSV Ratio < 2.0

<50% STENOSIS

     - Plaque < 50%; ICA PSV < 125 cm/sec; ICA/CCA PSV Ratio < 2.0

50-69% STENOSIS

     - Plaque > 50%; ICA PSV = 125-230 cm/sec; ICA/CCA PSV Ratio = 2.0-4.0

>70% BUT <100% STENOSIS

     - Plaque > 50%; ICA PSV > 230 cm/sec; ICA/CCA PSV Ratio > 4.0

NEAR OCCLUSION

     - Plaque = visible lumen; ICA PSV = high/low/none; ICA/CCA PSV Ratio = variable

TOTAL OCCLUSION

     - Plaque = no lumen; ICA PSV = none; ICA/CCA PSV Ratio = N/A

======================================================



Scribed by: Brenna Mahajan RDMS, RVT, RMSKS 

Scribed: 7/5/2022 3:43 PM 



 I have reviewed the images, agree with this report, and edited this report as needed.



Signer Name: Bryon Monahan MD 

Signed: 7/5/2022 4:47 PM

Workstation Name: [id number]

## 2022-07-06 ENCOUNTER — HOSPITAL ENCOUNTER (OUTPATIENT)
Dept: HOSPITAL 5 - 4A | Age: 87
Setting detail: OBSERVATION
LOS: 2 days | Discharge: HOME | End: 2022-07-08
Attending: INTERNAL MEDICINE | Admitting: INTERNAL MEDICINE
Payer: MEDICARE

## 2022-07-06 VITALS — SYSTOLIC BLOOD PRESSURE: 158 MMHG | DIASTOLIC BLOOD PRESSURE: 88 MMHG

## 2022-07-06 DIAGNOSIS — Z98.890: ICD-10-CM

## 2022-07-06 DIAGNOSIS — Z79.82: ICD-10-CM

## 2022-07-06 DIAGNOSIS — I10: ICD-10-CM

## 2022-07-06 DIAGNOSIS — Z79.899: ICD-10-CM

## 2022-07-06 DIAGNOSIS — R07.89: Primary | ICD-10-CM

## 2022-07-06 PROCEDURE — C1894 INTRO/SHEATH, NON-LASER: HCPCS

## 2022-07-06 PROCEDURE — 96374 THER/PROPH/DIAG INJ IV PUSH: CPT

## 2022-07-06 PROCEDURE — 96376 TX/PRO/DX INJ SAME DRUG ADON: CPT

## 2022-07-06 PROCEDURE — 36415 COLL VENOUS BLD VENIPUNCTURE: CPT

## 2022-07-06 PROCEDURE — 85025 COMPLETE CBC W/AUTO DIFF WBC: CPT

## 2022-07-06 PROCEDURE — 83735 ASSAY OF MAGNESIUM: CPT

## 2022-07-06 PROCEDURE — 97530 THERAPEUTIC ACTIVITIES: CPT

## 2022-07-06 PROCEDURE — 85610 PROTHROMBIN TIME: CPT

## 2022-07-06 PROCEDURE — 80048 BASIC METABOLIC PNL TOTAL CA: CPT

## 2022-07-06 PROCEDURE — 85730 THROMBOPLASTIN TIME PARTIAL: CPT

## 2022-07-06 PROCEDURE — G0378 HOSPITAL OBSERVATION PER HR: HCPCS

## 2022-07-06 PROCEDURE — 97162 PT EVAL MOD COMPLEX 30 MIN: CPT

## 2022-07-06 PROCEDURE — 93005 ELECTROCARDIOGRAM TRACING: CPT

## 2022-07-06 PROCEDURE — 96375 TX/PRO/DX INJ NEW DRUG ADDON: CPT

## 2022-07-06 PROCEDURE — G0379 DIRECT REFER HOSPITAL OBSERV: HCPCS

## 2022-07-06 PROCEDURE — 94760 N-INVAS EAR/PLS OXIMETRY 1: CPT

## 2022-07-06 PROCEDURE — 93458 L HRT ARTERY/VENTRICLE ANGIO: CPT

## 2022-07-06 RX ADMIN — HEPARIN SODIUM SCH UNIT: 5000 INJECTION, SOLUTION INTRAVENOUS; SUBCUTANEOUS at 09:41

## 2022-07-06 RX ADMIN — MORPHINE SULFATE PRN MG: 2 INJECTION, SOLUTION INTRAMUSCULAR; INTRAVENOUS at 07:48

## 2022-07-06 RX ADMIN — Medication SCH ML: at 09:37

## 2022-07-06 NOTE — ELECTROCARDIOGRAPH REPORT
[facility]

                                       

Test Date:    2022               Test Time:    08:02:40

Pat Name:     SKYLER CABRAL            Department:   

Patient ID:   [id number]          Room:         A486 1

Gender:       F                        Technician:   SHADI

:          1935               Requested By: VENICE PENDLETON

Order Number: [id number]              Reading MD:   Alondra Laws

                                 Measurements

Intervals                              Axis          

Rate:         80                       P:            46

AZ:           145                      QRS:          -36

QRSD:         108                      T:            -32

QT:           446                                    

QTc:          515                                    

                           Interpretive Statements

Sinus rhythm

T wave abnormality, consider inferolateral ischemia

Prolonged QT interval

Compared to ECG 2022 11:00:23

No significant change

Electronically Signed On 2022 17:56:22 EDT by Alondra Laws

## 2022-07-06 NOTE — ELECTROCARDIOGRAPH REPORT
[facility]

                                       

Test Date:    2022               Test Time:    17:35:50

Pat Name:     SKYLER CABRAL            Department:   

Patient ID:   [id number]          Room:         A486 1

Gender:       F                        Technician:   SUSANNAH CORCORAN RN

:          1935               Requested By: GLENIS GLYNN

Order Number: [id number]              Reading MD:   Alondra Laws

                                 Measurements

Intervals                              Axis          

Rate:         69                       P:            45

ME:           180                      QRS:          -36

QRSD:         105                      T:            31

QT:           440                                    

QTc:          470                                    

                           Interpretive Statements

Sinus rhythm

Left axis deviation

Nonspecific T wave abnormality

No previous ECG available for comparison

Electronically Signed On 2022 17:39:13 EDT by Alondra Laws

## 2022-07-06 NOTE — PROGRESS NOTE
Assessment and Plan





- Patient Problems


(1) Chest wall pain


Current Visit: Yes   Status: Acute   


Plan to address problem: 


Patient presents to the hospital with chest wall pain and tenderness, following 

a fall at home.  No cardiac work-up is indicated for chest wall pain and 

tenderness following reported trauma.





Continue ketorolac for management of chest wall pain.








Subjective


Date of service: 07/06/22


Principal diagnosis: Musculoskeletal chest pain


Interval history: 





Patient is breathing comfortably on room air, no acute distress.  Still has tend

erness to palpation of the anterior chest wall.  Rib x-rays were negative for 

fracture.





Objective


                                   Vital Signs











  Temp Pulse Resp BP Pulse Ox


 


 07/05/22 20:54  97.7 F   20  158/88 


 


 07/05/22 15:54  97.4 F L  79   188/92  87


 


 07/05/22 14:54      98














- Physical Examination


General: No Apparent Distress


HEENT: Positive: PERRL


Neck: Positive: neck supple


Cardiac: Positive: Reg Rate and Rhythm


Lungs: Positive: clear to auscultation


Neuro: Positive: Grossly Intact


Abdomen: Positive: Soft


Skin: Positive: Clear


Musculoskeletal: other (Tenderness to palpation over the anterior chest wall)


Extremities: Absent: edema

## 2022-07-06 NOTE — ELECTROCARDIOGRAPH REPORT
[facility]

                                       

Test Date:    2022               Test Time:    11:00:23

Pat Name:     SKYLER CABRAL            Department:   

Patient ID:   [id number]          Room:         A486 1

Gender:       F                        Technician:   SHADI

:          1935               Requested By: VENICE PENDLETON

Order Number: [id number]              Reading MD:   Alondra Laws

                                 Measurements

Intervals                              Axis          

Rate:         64                       P:            34

TN:           172                      QRS:          -31

QRSD:         113                      T:            -10

QT:           437                                    

QTc:          451                                    

                           Interpretive Statements

Sinus rhythm

Nonspecific T wave abnormality, consider anterior ischemia

Compared to ECG 2022 17:35:50

No significant change

Electronically Signed On 2022 17:46:57 EDT by Alondra Laws

## 2022-07-06 NOTE — DISCHARGE SUMMARY
Providers





- Providers


Date of Admission: 


07/04/22 23:32





Date of discharge: 07/06/22


Attending physician: 


JESSICA TOLBERT





                                        





07/04/22


Consult to Cardiac Rehabilitation [CONS] Routine 


   Reason For Exam: Phase I





07/05/22 10:20


Consult to Physician [CONS] Routine 


   Comment: 


   Consulting Provider: KAT LUNA


   Physician Instructions: 


   Reason For Exam: chest pain





07/06/22 12:57


Physical Therapy Evaluation and Treat [CONS] Routine 


   Comment: 


   Reason For Exam: Debility











Primary care physician: 


PRIMARY CARE MD








Hospitalization


Condition: Stable


Pertinent studies: 





Chest x-ray, cervical spine CT, head CT, lumbar spine CT, chest CTA, carotid 

Doppler, rib x-ray 


Disposition: 01 HOME / SELF CARE / HOMELESS


Final Discharge Diagnosis (Prints w/discharge instructions): -- Syncope-unclear 

etiology, likely vasovagal.  -- Chest pain-noncardiac: Tenderness on palpation 

likely due to trauma from syncope.  -- Hypertension.  -- Hyperlipidemia.  -- 

Lumbar spine compression fracture.  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


Time spent for discharge: 34 minutes





Exam





- Constitutional


Vitals: 


                                        











Temp Pulse Resp BP Pulse Ox


 


 97.7 F   79   20   158/88   87 


 


 07/05/22 20:54  07/05/22 15:54  07/05/22 20:54  07/05/22 20:54  07/05/22 15:54














Plan


Activity: advance as tolerated, fall precautions


Weight Bearing Status: Non-Weight Bearing


Diet: low fat, low salt


Follow up with: 


PRIMARY CARE,MD [Primary Care Provider] - 7 Days


NICHOL DE GUZMAN MD [Staff Physician] - 7 Days


EDDIE HERNANDEZ II, MD [Staff Physician] - 7 Days


Prescriptions: 


traMADoL [Ultram 50 MG tab] 50 mg PO Q6HR PRN #14 tablet


 PRN Reason: Pain

## 2022-07-06 NOTE — EVENT NOTE
Date: 07/06/22





Patient was planned for cardiac catheterization due to persistence of atypical 

chest pain. Difficult communication due to language barrier, patient was 

discharged today by the medical service without cardiology input. I have called 

the patient's granddaughter who is listed as next of kin, and recommended she 

brings patient back as direct admit. We have made bed available, and will 

proceed with diagnostic angio in am if patient's family consents.

## 2022-07-07 LAB
APTT BLD: 35.4 SEC. (ref 24.2–36.6)
BASOPHILS # (AUTO): 0 K/MM3 (ref 0–0.1)
BASOPHILS NFR BLD AUTO: 0.3 % (ref 0–1.8)
BUN SERPL-MCNC: 18 MG/DL (ref 7–17)
BUN/CREAT SERPL: 20 %
CALCIUM SERPL-MCNC: 8.5 MG/DL (ref 8.4–10.2)
EOSINOPHIL # BLD AUTO: 0.1 K/MM3 (ref 0–0.4)
EOSINOPHIL NFR BLD AUTO: 2 % (ref 0–4.3)
HCT VFR BLD CALC: 36.7 % (ref 30.3–42.9)
HEMOLYSIS INDEX: 3
HGB BLD-MCNC: 12.3 GM/DL (ref 10.1–14.3)
INR PPP: 1.02 (ref 0.87–1.13)
LYMPHOCYTES # BLD AUTO: 1.3 K/MM3 (ref 1.2–5.4)
LYMPHOCYTES NFR BLD AUTO: 18.3 % (ref 13.4–35)
MCHC RBC AUTO-ENTMCNC: 34 % (ref 30–34)
MCV RBC AUTO: 92 FL (ref 79–97)
MONOCYTES # (AUTO): 0.8 K/MM3 (ref 0–0.8)
MONOCYTES % (AUTO): 11.4 % (ref 0–7.3)
PLATELET # BLD: 168 K/MM3 (ref 140–440)
RBC # BLD AUTO: 4 M/MM3 (ref 3.65–5.03)

## 2022-07-07 RX ADMIN — HEPARIN SODIUM ONE MLS: 5000 INJECTION, SOLUTION INTRAVENOUS; SUBCUTANEOUS at 12:03

## 2022-07-07 RX ADMIN — Medication SCH ML: at 02:07

## 2022-07-07 RX ADMIN — DOCUSATE SODIUM SCH: 100 CAPSULE, LIQUID FILLED ORAL at 01:54

## 2022-07-07 RX ADMIN — DOCUSATE SODIUM SCH: 100 CAPSULE, LIQUID FILLED ORAL at 22:15

## 2022-07-07 RX ADMIN — Medication SCH ML: at 22:13

## 2022-07-07 RX ADMIN — METOPROLOL TARTRATE SCH MG: 25 TABLET, FILM COATED ORAL at 17:51

## 2022-07-07 RX ADMIN — FAMOTIDINE SCH MG: 10 TABLET ORAL at 17:51

## 2022-07-07 RX ADMIN — LIDOCAINE HYDROCHLORIDE ONE ML: 20 INJECTION, SOLUTION INFILTRATION; PERINEURAL at 12:01

## 2022-07-07 RX ADMIN — FAMOTIDINE SCH MG: 10 TABLET ORAL at 22:06

## 2022-07-07 RX ADMIN — HEPARIN SODIUM ONE MLS: 5000 INJECTION, SOLUTION INTRAVENOUS; SUBCUTANEOUS at 11:52

## 2022-07-07 RX ADMIN — FENTANYL CITRATE ONE MCG: 50 INJECTION, SOLUTION INTRAMUSCULAR; INTRAVENOUS at 11:47

## 2022-07-07 RX ADMIN — VERAPAMIL HYDROCHLORIDE ONE MG: 2.5 INJECTION INTRAVENOUS at 11:51

## 2022-07-07 RX ADMIN — NITROGLYCERIN ONE MLS: 20 INJECTION INTRAVENOUS at 12:03

## 2022-07-07 RX ADMIN — MORPHINE SULFATE PRN MG: 2 INJECTION, SOLUTION INTRAMUSCULAR; INTRAVENOUS at 06:51

## 2022-07-07 RX ADMIN — METOPROLOL TARTRATE SCH: 25 TABLET, FILM COATED ORAL at 10:00

## 2022-07-07 RX ADMIN — VERAPAMIL HYDROCHLORIDE ONE MG: 2.5 INJECTION INTRAVENOUS at 12:03

## 2022-07-07 RX ADMIN — HEPARIN SODIUM ONE UNIT: 1000 INJECTION, SOLUTION INTRAVENOUS; SUBCUTANEOUS at 12:03

## 2022-07-07 RX ADMIN — LIDOCAINE HYDROCHLORIDE ONE ML: 20 INJECTION, SOLUTION INFILTRATION; PERINEURAL at 11:50

## 2022-07-07 RX ADMIN — HEPARIN SODIUM ONE UNIT: 1000 INJECTION, SOLUTION INTRAVENOUS; SUBCUTANEOUS at 11:50

## 2022-07-07 RX ADMIN — ASPIRIN SCH: 81 TABLET, COATED ORAL at 10:00

## 2022-07-07 RX ADMIN — FAMOTIDINE SCH: 10 TABLET ORAL at 10:01

## 2022-07-07 RX ADMIN — NITROGLYCERIN ONE MLS: 20 INJECTION INTRAVENOUS at 11:52

## 2022-07-07 RX ADMIN — ASPIRIN SCH MG: 81 TABLET, COATED ORAL at 17:50

## 2022-07-07 RX ADMIN — MORPHINE SULFATE PRN MG: 2 INJECTION, SOLUTION INTRAMUSCULAR; INTRAVENOUS at 02:32

## 2022-07-07 RX ADMIN — DOCUSATE SODIUM SCH: 100 CAPSULE, LIQUID FILLED ORAL at 10:00

## 2022-07-07 RX ADMIN — NITROGLYCERIN SCH: 20 OINTMENT TOPICAL at 05:57

## 2022-07-07 RX ADMIN — HEPARIN SODIUM SCH: 5000 INJECTION, SOLUTION INTRAVENOUS; SUBCUTANEOUS at 10:00

## 2022-07-07 RX ADMIN — HEPARIN SODIUM SCH UNIT: 5000 INJECTION, SOLUTION INTRAVENOUS; SUBCUTANEOUS at 02:05

## 2022-07-07 RX ADMIN — NITROGLYCERIN SCH: 20 OINTMENT TOPICAL at 10:00

## 2022-07-07 RX ADMIN — METOPROLOL TARTRATE SCH MG: 25 TABLET, FILM COATED ORAL at 02:07

## 2022-07-07 RX ADMIN — HEPARIN SODIUM SCH UNIT: 5000 INJECTION, SOLUTION INTRAVENOUS; SUBCUTANEOUS at 22:10

## 2022-07-07 RX ADMIN — FENTANYL CITRATE ONE MCG: 50 INJECTION, SOLUTION INTRAMUSCULAR; INTRAVENOUS at 12:00

## 2022-07-07 NOTE — DISCHARGE SUMMARY
Short Stay Discharge Plan


Activity: advance as tolerated


Weight Bearing Status: Partial Weight Bearing


Diet: low fat, low cholesterol, low salt


Wound: keep clean and dry


Special Instructions: no heavy lifting (3 days)


Follow up with: 


LIA KAUFMAN MD [Primary Care Provider] - 7 Days


KAT LUNA MD [Staff Physician] - 7 Days


Prescriptions: 


Aspirin EC [Halfprin EC] 81 mg PO QDAY #30 tablet.


Metoprolol [Lopressor TAB] 25 mg PO BID #60 tablet


Losartan/Hydrochlorothiazide [Losartan-Hctz 50-12.5 mg Tab] 1 each PO QDAY #30


Nitroglycerin [Nitro Dur] 0.2 mg TD QDAY #30 patch


Clopidogrel [Plavix] 75 mg PO QDAY #30 tablet

## 2022-07-07 NOTE — CARDIAC CATHERIZATION REPORT
DATE OF SERVICE: 07/07/2022



CARDIAC CATHETERIZATION REPORT



REASON FOR PROCEDURE:  Chest pain, abnormal ECG.



PROCEDURES:

1.  Left heart catheterization.

2.  Selective left and right coronary angiography.

3.  Left ventricular angiography.

4.  Sedation time start 12:00, end 12:13.



DESCRIPTION OF PROCEDURE:  The patient was prepped and draped in a sterile 

fashion after informed consent.  The right radial cath site was prepped and 

draped after negative Kamaljit's test.  The right radial artery was entered using 

Seldinger technique followed by placement of a 6-Stateless hydrophilic sheath.  

Routine radial cocktail was administered via the sheath.



Selective left and right coronary angiography was performed using a #3.5 left 

Derrick and a #4 right Derrick.  The pigtail catheter was used for left 

ventricular angiography.  The catheters were then removed, sheath removed and 

hemostasis achieved using a TR band.  The patient was returned to the 

postprocedure unit in stable condition.  There were no complications.



FINDINGS:

HEMODYNAMICS:  Left ventricular end-diastolic pressure was 26, following 

coronary angiography.  Ascending aortic pressure was 170/70.  There was no 

significant pressure gradient on pullback across the aortic valve.



CORONARY ANGIOGRAPHY:  Left main coronary artery was free of significant 

disease.



There was a 20% stenosis of the proximal LAD, followed by mild luminal 

irregularities of the mid segment.  Otherwise, no significant lesions were noted

in the LAD system.



The large obtuse marginal branch of the circumflex artery contained mild diffuse

atherosclerosis.  No significant obstructive lesions were noted in this system.



The right coronary artery also contained mild luminal irregularities in its mid 

segment.  This vessel was dominant.  There were no significant obstructive 

lesions in the right coronary system.



The left ventricle was mildly dilated.  There was mild left ventricular systolic

dysfunction with left ventricular ejection fraction 40-45%.



CONCLUSION:

1.  Mild luminal irregularities as above, no significant obstructive lesions.

2.  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



RECOMMENDATIONS:  Aggressive risk factor modification and medical therapy.







DD: 07/07/2022 12:20 PM

DT: 07/07/2022 12:31 PM

TID: [id number] RECEIPT: [id number]

CA/MIKE

## 2022-07-07 NOTE — EVENT NOTE
Date: 07/07/22





Cardiac catheterization completed via the right radial approach, no 

complications.  We found widely patent coronary arteries, with only mild diffuse

atherosclerosis.  Left ventricular ejection fraction was 40 to 45%.





The patient will be treated with medical therapy including topical nitrates, 

baby aspirin and Plavix, beta-blockers and atorvastatin.  We will discontinue 

home diltiazem.  Discharge tomorrow morning after postcardiac catheterization 

observation.

## 2022-07-07 NOTE — HISTORY AND PHYSICAL REPORT
History of Present Illness


Date of examination: 07/07/22


Date of admission: 


07/07/22 00:41





Chief complaint: 





Chest pain


History of present illness: 





The patient was hospitalized here at the past 2 days with chest pain.  Patient 

has some appearance of mild dementia, and poor historian due to language 

barrier, but reports persistent chest pain which appeared associated with marked

tenderness to palpation of the chest wall.  The symptoms according to have began

after she tried to get out of bed at home, slipped on the and fell on the floor 

on her chest.





We performed x-rays of the ribs which were negative, and treated her with 

Toradol, but her chest pain did not appear to subside.  Due to concerns about 

her continued symptom complex, as well as the appearance of T wave abnormalities

on the EKG, we recommended diagnostic coronary angiography for definitive 

assessment of coronary ischemia as etiology of her chest pain.





Unfortunately, before the angiogram could be done, the patient was discharged 

home by the medical service without checking with the cardiology consultant.  We

were however able to recall the patient to return, readmitted to the telemetry 

unit, appears comfortable, no acute distress, and the patient's family have 

consented to proceed with cardiac catheterization.  Risks and benefits of the 

procedure have been discussed and they have given consent.





Past History


Past Medical History: hypertension





Medications and Allergies


                                    Allergies











Allergy/AdvReac Type Severity Reaction Status Date / Time


 


No Known Allergies Allergy   Unverified 07/04/22 17:24











                                Home Medications











 Medication  Instructions  Recorded  Confirmed  Last Taken  Type


 


AtorvaSTATin [Lipitor] 20 mg PO QHS 07/05/22 07/05/22 07/04/22 09:00 History


 


Cyproheptadine [Periactin] 4 mg PO BID 07/05/22 07/05/22 07/04/22 09:00 History


 


Cyproheptadine [Periactin] 4 mg PO BID 07/05/22 07/05/22 07/04/22 09:00 History


 


Diltiazem HCl [Diltiazem 24Hr ER] 120 mg PO DAILY 07/05/22 07/05/22 07/04/22 

09:00 History


 


Duloxetine HCl 20 mg PO DAILY 07/05/22 07/05/22 07/04/22 09:00 History


 


Spironolactone [Aldactone] 25 mg PO QDAY 07/05/22 07/05/22 07/04/22 09:00 

History


 


hydrALAZINE [Apresoline TAB] 25 mg PO BID 07/05/22 07/05/22 07/04/22 09:00 

History


 


traMADoL [Ultram 50 MG tab] 50 mg PO Q6HR PRN #14 tablet 07/06/22  Unknown Rx











Active Meds: 


Active Medications





Acetaminophen (Acetaminophen 325 Mg Tab)  650 mg PO Q4H PRN


   PRN Reason: Pain MILD(1-3)/Fever >100.5/HA


Aspirin (Aspirin Ec 81 Mg Tab)  81 mg PO QDAY [facility]


Clopidogrel Bisulfate (Clopidogrel 75 Mg Tab)  75 mg PO QDAY [facility]


Docusate Sodium (Docusate Sodium 100 Mg Cap)  100 mg PO BID [facility]


   Last Admin: 07/07/22 01:54 Dose:  Not Given


   


Famotidine (Famotidine 10 Mg Tab)  10 mg PO BID [facility]


Heparin Sodium (Porcine) (Heparin 5,000 Unit/1 Ml Vial)  5,000 unit SUB-Q Q12HR 

[facility]


   Last Admin: 07/07/22 02:05 Dose:  5,000 unit


   


Sodium Chloride (Nacl 0.9% 500 Ml)  500 mls @ 50 mls/hr IV AS DIRECT [facility]


   Last Admin: 07/07/22 11:08 Dose:  50 mls/hr


   


Metoprolol Tartrate (Metoprolol Tartrate 25 Mg Tab)  25 mg PO BID [facility]


   Last Admin: 07/07/22 02:07 Dose:  25 mg


   


Morphine Sulfate (Morphine 2 Mg/1 Ml Inj)  2 mg IV Q4H PRN


   PRN Reason: Pain, Moderate (4-6)


   Last Admin: 07/07/22 06:51 Dose:  2 mg


   


Morphine Sulfate (Morphine 4 Mg/1 Ml Inj)  4 mg IV Q4H PRN


   PRN Reason: Pain , Severe (7-10)


Nitroglycerin (Nitroglycerin 2% Oint 1 Gm)  1 inch TP QIDNTG [facility]; Protocol


   Last Admin: 07/07/22 05:57 Dose:  Not Given


   


Ondansetron HCl (Ondansetron 4 Mg/2 Ml Inj)  4 mg IV Q8H PRN


   PRN Reason: Nausea And Vomiting


Sodium Chloride (Sodium Chloride 0.9% 10 Ml Flush Syringe)  10 ml IV BID [facility]


   Last Admin: 07/07/22 02:07 Dose:  10 ml


   


Sodium Chloride (Sodium Chloride 0.9% 10 Ml Flush Syringe)  10 ml IV PRN PRN


   PRN Reason: LINE FLUSH











Review of Systems


Cardiovascular: chest pain, shortness of breath, no orthopnea, no palpitations, 

no rapid/irregular heart beat, no edema, no syncope, no lightheadedness





Physical Examination


                                   Vital Signs











Pulse


 


 75 


 


 07/07/22 01:20











General appearance: no acute distress


HEENT: Positive: PERRL


Neck: Positive: neck supple


Cardiac: Positive: Reg Rate and Rhythm


Lungs: Positive: clear to auscultation


Neuro: Positive: Grossly Intact


Abdomen: Positive: Soft


Female genitourinary: deferred


Skin: Positive: Clear


Extremities: Absent: edema





Results





                                 07/07/22 03:22





                                 07/07/22 03:22


                                   Coagulation











  07/07/22 Range/Units





  03:22 


 


PT  14.5  (12.2-14.9)  Sec.


 


INR  1.02  (0.87-1.13)  


 


APTT  35.4  (24.2-36.6)  Sec.








                                       CBC











  07/07/22 Range/Units





  03:22 


 


WBC  7.0  (4.5-11.0)  K/mm3


 


RBC  4.00  (3.65-5.03)  M/mm3


 


Hgb  12.3  (10.1-14.3)  gm/dl


 


Hct  36.7  (30.3-42.9)  %


 


Plt Count  168  (140-440)  K/mm3


 


Lymph # (Auto)  1.3  (1.2-5.4)  K/mm3


 


Mono # (Auto)  0.8  (0.0-0.8)  K/mm3


 


Eos # (Auto)  0.1  (0.0-0.4)  K/mm3


 


Baso # (Auto)  0.0  (0.0-0.1)  K/mm3








                          Comprehensive Metabolic Panel











  07/07/22 Range/Units





  03:22 


 


Sodium  139  (137-145)  mmol/L


 


Potassium  3.9  (3.6-5.0)  mmol/L


 


Chloride  102.1  ()  mmol/L


 


Carbon Dioxide  28  (22-30)  mmol/L


 


BUN  18 H  (7-17)  mg/dL


 


Creatinine  0.9  (0.6-1.2)  mg/dL


 


Glucose  115 H  ()  mg/dL


 


Calcium  8.5  (8.4-10.2)  mg/dL














EKG interpretations





- Telemetry


EKG Rhythm: Sinus Rhythm (With inferolateral T wave abnormality)





Assessment and Plan





- Patient Problems


(1) Atypical chest pain


Current Visit: Yes   Status: Acute   


Plan to address problem: 


86-year-old woman with mild dementia, poor historian due to language barrier, 

presents with chest pain with atypical features.  Due to concerns of continued 

chest pain and the evolution of inferior lateral T wave changes on EKG, we we 

will proceed with diagnostic cardiac catheterization and coronary angiography.

## 2022-07-08 VITALS — DIASTOLIC BLOOD PRESSURE: 81 MMHG | SYSTOLIC BLOOD PRESSURE: 177 MMHG

## 2022-07-08 RX ADMIN — METOPROLOL TARTRATE SCH MG: 25 TABLET, FILM COATED ORAL at 00:15

## 2022-07-08 NOTE — ELECTROCARDIOGRAPH REPORT
[facility]

                                       

Test Date:    2022               Test Time:    07:33:49

Pat Name:     SKYLER CABRAL            Department:   

Patient ID:   [id number]          Room:         A469 1

Gender:       F                        Technician:   SHADI

:          1935               Requested By: ALONDRA LAWS

Order Number: [id number]              Reading MD:   Alondra Laws

                                 Measurements

Intervals                              Axis          

Rate:         67                       P:            33

WA:           160                      QRS:          -56

QRSD:         103                      T:            -41

QT:           477                                    

QTc:          504                                    

                           Interpretive Statements

Sinus rhythm

Left axis deviation

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

T wave abnormality consider inferolateral ischemia

Prolonged QT interval

Compared to ECG 2022 08:02:40

No significant change

Electronically Signed On 2022 17:45:07 EDT by Alondra Laws